# Patient Record
(demographics unavailable — no encounter records)

---

## 2024-10-25 NOTE — HISTORY OF PRESENT ILLNESS
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  Since last visit, patient reports no new stroke-like symptoms. She is tolerating her Aspirin and Atorvastatin without bleeding, bruising or myalgias. BP today 108/74. She tries to eat healthy with minimal salt, sugar, red meat and fried foods. She primarily only eats chicken and fish for protein with vegetables. She walks 3-4 days per week. She reports no recent blood work. ILR without AF since implantation.

## 2024-10-25 NOTE — ASSESSMENT
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  Plan: -Continue Aspirin and Atorvastatin for secondary stroke prevention -Stroke labs today including Lipoprotein A -Continue aggressive vascular risk factor with PCP, BP goal <130/80 and LDL goal <70 -Continue f/u with EP for ILR interrogation -Counselled on healthy eating (DASH/Mediterranean diet, limiting red meats and fried foods) -Counselled on importance of regular exercise and remaining active -Counselled on f/u with PCP regarding regular health maintenance and prevention, including routine screening -Counselled on signs of stroke BEFAST and to call 911 with any new or worsening neurological symptoms -RTO in 6 months; will repeat carotid US at that time

## 2024-10-25 NOTE — PHYSICAL EXAM
[FreeTextEntry1] : Alert. Fully oriented. Speech and language are intact. Cranial nerves II-XII are intact. Motor exam reveals intact strength with individual muscle testing in bilateral upper and lower extremities. Tone is normal. Sensation is intact to light touch in distal extremities. Finger-to-nose is intact. Rapid alternating movements are normal in the upper and lower extremities. Gait is normal.